# Patient Record
Sex: MALE | URBAN - METROPOLITAN AREA
[De-identification: names, ages, dates, MRNs, and addresses within clinical notes are randomized per-mention and may not be internally consistent; named-entity substitution may affect disease eponyms.]

---

## 2024-11-11 ENCOUNTER — OFFICE VISIT (OUTPATIENT)
Dept: ORTHOPEDIC SURGERY | Age: 59
End: 2024-11-11
Payer: COMMERCIAL

## 2024-11-11 ENCOUNTER — TELEPHONE (OUTPATIENT)
Dept: ORTHOPEDIC SURGERY | Age: 59
End: 2024-11-11

## 2024-11-11 VITALS — HEIGHT: 71 IN | WEIGHT: 227 LBS | BODY MASS INDEX: 31.78 KG/M2

## 2024-11-11 DIAGNOSIS — M47.816 FACET ARTHROPATHY, LUMBAR: ICD-10-CM

## 2024-11-11 DIAGNOSIS — M54.50 LUMBAR PAIN: Primary | ICD-10-CM

## 2024-11-11 DIAGNOSIS — M51.360 DEGENERATION OF INTERVERTEBRAL DISC OF LUMBAR REGION WITH DISCOGENIC BACK PAIN: ICD-10-CM

## 2024-11-11 PROCEDURE — 99204 OFFICE O/P NEW MOD 45 MIN: CPT | Performed by: PHYSICIAN ASSISTANT

## 2024-11-11 RX ORDER — CELECOXIB 200 MG/1
200 CAPSULE ORAL 2 TIMES DAILY
Qty: 60 CAPSULE | Refills: 0 | Status: SHIPPED | OUTPATIENT
Start: 2024-11-11

## 2024-11-11 NOTE — PROGRESS NOTES
New Patient: LUMBAR SPINE    Referring Provider:  No ref. provider found    CHIEF COMPLAINT:    Chief Complaint   Patient presents with    Back Pain     lumbar       HISTORY OF PRESENT ILLNESS:       Mr. Александр Jovel  is a pleasant 59 y.o. male here for evaluation regarding his LBP patient states that his pain began insidiously a few months ago.  He states that since then the pain has continued which she rates 8/10 within the left side of his low back with no radiation of pain into either lower extremity.  He states that the pain can be intermittent to constant and last mostly throughout the day and does interfere with his sleep at night mainly with positioning.  He finds that standing and walking has been limited to approximately 10 minutes but he denies any radicular symptoms, numbness, tingling, or weakness into either lower extremity.  He denies any bowel or bladder dysfunction or saddle anesthesia.  He finds that sittings better than standing and lying down and has had no prior treatments..   Pain Assessment  Location of Pain: Back  Location Modifiers: Other (Comment)  Severity of Pain: 8  Quality of Pain: Other (Comment)  Duration of Pain: Other (Comment)  Frequency of Pain: Intermittent]  Current/Past Treatment:   Physical Therapy: None  Chiropractic: None  Injection: None  Medications: Aleve as needed    Past Medical History:   History reviewed. No pertinent past medical history.     Past Surgical History:     History reviewed. No pertinent surgical history.    Current Medications:     Current Outpatient Medications:     celecoxib (CELEBREX) 200 MG capsule, Take 1 capsule by mouth 2 times daily, Disp: 60 capsule, Rfl: 0    Allergies:  Patient has no known allergies.    Social History:    reports that he has never smoked. He has never used smokeless tobacco.    Family History:   History reviewed. No pertinent family history.    REVIEW OF SYSTEMS: Full ROS noted & scanned   CONSTITUTIONAL: Denies